# Patient Record
Sex: MALE | Race: WHITE | NOT HISPANIC OR LATINO | URBAN - METROPOLITAN AREA
[De-identification: names, ages, dates, MRNs, and addresses within clinical notes are randomized per-mention and may not be internally consistent; named-entity substitution may affect disease eponyms.]

---

## 2017-07-31 ENCOUNTER — OUTPATIENT (OUTPATIENT)
Dept: OUTPATIENT SERVICES | Facility: HOSPITAL | Age: 57
LOS: 1 days | Discharge: HOME | End: 2017-07-31

## 2017-07-31 DIAGNOSIS — G35 MULTIPLE SCLEROSIS: ICD-10-CM

## 2019-04-25 PROBLEM — Z00.00 ENCOUNTER FOR PREVENTIVE HEALTH EXAMINATION: Status: ACTIVE | Noted: 2019-04-25

## 2019-06-04 ENCOUNTER — RECORD ABSTRACTING (OUTPATIENT)
Age: 59
End: 2019-06-04

## 2019-06-04 DIAGNOSIS — Z78.9 OTHER SPECIFIED HEALTH STATUS: ICD-10-CM

## 2019-06-04 DIAGNOSIS — F41.9 ANXIETY DISORDER, UNSPECIFIED: ICD-10-CM

## 2019-08-08 ENCOUNTER — APPOINTMENT (OUTPATIENT)
Dept: NEUROLOGY | Facility: CLINIC | Age: 59
End: 2019-08-08
Payer: COMMERCIAL

## 2019-08-08 ENCOUNTER — OUTPATIENT (OUTPATIENT)
Dept: OUTPATIENT SERVICES | Facility: HOSPITAL | Age: 59
LOS: 1 days | Discharge: HOME | End: 2019-08-08

## 2019-08-08 VITALS
BODY MASS INDEX: 24.34 KG/M2 | HEIGHT: 70 IN | DIASTOLIC BLOOD PRESSURE: 83 MMHG | WEIGHT: 170 LBS | SYSTOLIC BLOOD PRESSURE: 133 MMHG | HEART RATE: 66 BPM

## 2019-08-08 DIAGNOSIS — G35 MULTIPLE SCLEROSIS: ICD-10-CM

## 2019-08-08 PROCEDURE — 99213 OFFICE O/P EST LOW 20 MIN: CPT

## 2019-08-08 NOTE — ASSESSMENT
[FreeTextEntry1] : Chronic MS, increased symptoms on left probably related to increased body temperature. Discussed option of MRI brain/spine but declined. \par \par 1.  Continue tecfidera 240 mg bid.\par 2.  Cont. Vit D  4,000 IU daily.\par 3.  Discussed environmental temperature control.\par 4.  Discussed option of left foot AFO but declines at this time.\par 5.  Return in 6 months.\par 6.  Refills given of Xanax 1 mg 1/2 to 1 tab up to twice a day (states 60 tabs will last him 6 months).\par 7.  Baclofen 10 mg one and one-half tab po tid.\par 8.  Check CMP, CBC.

## 2019-08-08 NOTE — HISTORY OF PRESENT ILLNESS
[FreeTextEntry1] : Patient is a 58 year old male who is being seen today as a follow up for MS.  Patient p/w reemergence of old symptoms most likely from the summer heat and life stressors.  He c/o balance and weakness of his left leg and foot.  He continues to exercise in a gym 6 days a week but has noticed loss of balance on his left side, especially when lifting medicine ball/weight towards his left side.  He has left foot drop and If he does not pay attention while walking he will fall.  He continues to see pain management for right side sciatica pain.

## 2019-08-20 LAB
ALBUMIN SERPL ELPH-MCNC: 4.7 G/DL
ALP BLD-CCNC: 77 U/L
ALT SERPL-CCNC: 40 U/L
ANION GAP SERPL CALC-SCNC: 13 MMOL/L
AST SERPL-CCNC: 31 U/L
BASOPHILS # BLD AUTO: 0.04 K/UL
BASOPHILS NFR BLD AUTO: 0.5 %
BILIRUB SERPL-MCNC: 0.3 MG/DL
BUN SERPL-MCNC: 26 MG/DL
CALCIUM SERPL-MCNC: 9.6 MG/DL
CHLORIDE SERPL-SCNC: 101 MMOL/L
CO2 SERPL-SCNC: 28 MMOL/L
CREAT SERPL-MCNC: 0.8 MG/DL
EOSINOPHIL # BLD AUTO: 0.14 K/UL
EOSINOPHIL NFR BLD AUTO: 1.8 %
GLUCOSE SERPL-MCNC: 103 MG/DL
HCT VFR BLD CALC: 43.9 %
HGB BLD-MCNC: 14.9 G/DL
IMM GRANULOCYTES NFR BLD AUTO: 0.5 %
LYMPHOCYTES # BLD AUTO: 1.08 K/UL
LYMPHOCYTES NFR BLD AUTO: 13.6 %
MAN DIFF?: NORMAL
MCHC RBC-ENTMCNC: 33.4 PG
MCHC RBC-ENTMCNC: 33.9 G/DL
MCV RBC AUTO: 98.4 FL
MONOCYTES # BLD AUTO: 0.81 K/UL
MONOCYTES NFR BLD AUTO: 10.2 %
NEUTROPHILS # BLD AUTO: 5.84 K/UL
NEUTROPHILS NFR BLD AUTO: 73.4 %
PLATELET # BLD AUTO: 276 K/UL
POTASSIUM SERPL-SCNC: 4.9 MMOL/L
PROT SERPL-MCNC: 7.1 G/DL
RBC # BLD: 4.46 M/UL
RBC # FLD: 12.6 %
SODIUM SERPL-SCNC: 142 MMOL/L
WBC # FLD AUTO: 7.95 K/UL

## 2019-08-30 ENCOUNTER — OTHER (OUTPATIENT)
Age: 59
End: 2019-08-30

## 2019-08-30 DIAGNOSIS — R10.9 UNSPECIFIED ABDOMINAL PAIN: ICD-10-CM

## 2019-09-10 ENCOUNTER — RX RENEWAL (OUTPATIENT)
Age: 59
End: 2019-09-10

## 2020-01-08 ENCOUNTER — RX RENEWAL (OUTPATIENT)
Age: 60
End: 2020-01-08

## 2020-02-11 ENCOUNTER — APPOINTMENT (OUTPATIENT)
Dept: NEUROLOGY | Facility: CLINIC | Age: 60
End: 2020-02-11
Payer: COMMERCIAL

## 2020-02-11 VITALS
BODY MASS INDEX: 24.34 KG/M2 | HEIGHT: 70 IN | DIASTOLIC BLOOD PRESSURE: 87 MMHG | TEMPERATURE: 97.9 F | HEART RATE: 73 BPM | OXYGEN SATURATION: 97 % | WEIGHT: 170 LBS | SYSTOLIC BLOOD PRESSURE: 152 MMHG

## 2020-02-11 PROCEDURE — 99213 OFFICE O/P EST LOW 20 MIN: CPT

## 2020-02-11 NOTE — PHYSICAL EXAM
[FreeTextEntry1] : Patient has slight facial asymmetry but fluent speech, no dysarthria.\par EOM's full.\par Motor:  5/5 right upper and lower extremity.  5/5 left upper extremity.\par Left lower extremity 4/5 left HF, 4+/5 left knee extension 4/5 left knee flexion, 4/5 left foot DF, 4-/5 eversion.\par Increased tone left lower extremity.\par Spastic gait.\par Sensory intact vibration, LT and PP upper and lower extremities\par TTP bilateral forefeet over metatarsals.

## 2020-02-11 NOTE — HISTORY OF PRESENT ILLNESS
[FreeTextEntry1] : Pt presents for f/u of MS.  Has disease now for 30 years.  Feels he is secondary progressive phase.  Has flare late August and given oral steroids 250 mg/day x 5 days but only able to tolerate 3 days of the steroids.  Trouble sleeping, agitation.  Stretches for an hour a day.  Does gym 6 days a week for 1.5 to 2 hours.  States increase in foot pain.  Squats a couple of hundred pounds.  Feels left leg is atrophying.  He noticies decrease in strength.  Feels he wants to fight to stay out of wheelchairs.  Taking 1.5 tabs of 10 mg baclofen three times a day 45 mg.   Can't do things for long due to foot pain.  Has had foot pain for a couple of month.  23-Mar-2017

## 2020-02-11 NOTE — ASSESSMENT
[FreeTextEntry1] : Relapsing progressive MS.  We talked about different treatment options.  He wishes to continue Tecfidera and does not want to increase baclofen.  He declines neuroimaging.  We discussed treatment options of adding monthly IV Solumedrol 1,000 mg or treating with methotrexate but he declined.  He will continue vitamin D 4,000 IU daily and will get Vit D level checked through PCP.  I offered him bracing for his left foot drop but he declines.  I did encourage him to see a  podiatrist regarding his bilateral forefoot pain.  He will return in 6 months.

## 2020-08-10 ENCOUNTER — APPOINTMENT (OUTPATIENT)
Dept: NEUROLOGY | Facility: CLINIC | Age: 60
End: 2020-08-10

## 2020-10-06 ENCOUNTER — APPOINTMENT (OUTPATIENT)
Dept: NEUROLOGY | Facility: CLINIC | Age: 60
End: 2020-10-06
Payer: COMMERCIAL

## 2020-10-06 VITALS
HEART RATE: 76 BPM | SYSTOLIC BLOOD PRESSURE: 146 MMHG | TEMPERATURE: 97.5 F | WEIGHT: 165 LBS | DIASTOLIC BLOOD PRESSURE: 82 MMHG | HEIGHT: 70 IN | BODY MASS INDEX: 23.62 KG/M2 | OXYGEN SATURATION: 98 %

## 2020-10-06 DIAGNOSIS — M54.5 LOW BACK PAIN: ICD-10-CM

## 2020-10-06 PROCEDURE — 99214 OFFICE O/P EST MOD 30 MIN: CPT

## 2020-10-06 RX ORDER — BACLOFEN 10 MG/1
10 TABLET ORAL
Qty: 135 | Refills: 11 | Status: DISCONTINUED | COMMUNITY
Start: 2019-08-08 | End: 2020-10-06

## 2020-10-06 NOTE — HISTORY OF PRESENT ILLNESS
[FreeTextEntry1] : Pt presents for f/u of relapsing progressive MS.  Has MS now for 32 years.  Not able to workout now for approaching 170+ days now.  States lost 5 lbs.  Feels that legs have been like a buzzing feeling.  Has fallen quite a few times, balance is always an issue.  Has to be cautious, starts to forget and marching as if normal then falls.   \par \par Walking unsteady gait, wakes up with cramping in legs, last baclofen 12 hours earlier.  Takes it first thing in am with fish oil and vitamin D.  Still on Tecfidera.  \par \par States he feels in definite decline.  first 3 digits numb and tingling, seems to come and go with other MS symptoms.  C/o lots of back.\par \par 1 year ago was able to climb stairs better.  \par \par States went to gym recently in process of reopening.  Squatting was more difficult.  Tougher climbing the stairs.  \par \par States had one flare up over summer states the steroids made him crazy, had to take romel 3-4 days of it before stopping.  States felt maybe shortened course but did not like the side effects.\par \par \par Was seeing pain doctors for chronic LBP on right.  Has received nerve ablation, epidural steroids.  Only ablation helped.   Pain bothers him all day.  \par

## 2020-10-06 NOTE — ASSESSMENT
[FreeTextEntry1] : Relapsing progressive MS.  Discussed alternative treatments includign Ocrevus but patient wishes to continue the Tecfidera.  Will check CMP and CBC.  States PCP managing Vit D and levels normal.  Will add methocarbamol 500 mg 1/2 to 1 tab po qhs to help with LBP until he gets back in to see pain doctors.\par \par Return in 6 months.

## 2020-10-06 NOTE — PHYSICAL EXAM
[FreeTextEntry1] : Speech is fluent, no dysarthria, , EOM's full.\par 5/5 proximal and distal UE's except for 4+/5 right thumb abduction and opposition.  \par Sensory decreased sensation to LT, PP digits 1-3 on right hand,  + Tinel's.\par RLE 5/5 \par LLE 4/5 proximally and distally.\par Unsteady gait, spastic and circumducting.

## 2020-12-09 LAB
ALBUMIN SERPL ELPH-MCNC: 4.4 G/DL
ALP BLD-CCNC: 83 U/L
ALT SERPL-CCNC: 28 U/L
ANION GAP SERPL CALC-SCNC: 13 MMOL/L
AST SERPL-CCNC: 23 U/L
BASOPHILS # BLD AUTO: 0.02 K/UL
BASOPHILS NFR BLD AUTO: 0.3 %
BILIRUB SERPL-MCNC: 0.3 MG/DL
BUN SERPL-MCNC: 20 MG/DL
CALCIUM SERPL-MCNC: 9.8 MG/DL
CHLORIDE SERPL-SCNC: 101 MMOL/L
CO2 SERPL-SCNC: 28 MMOL/L
CREAT SERPL-MCNC: 0.7 MG/DL
EOSINOPHIL # BLD AUTO: 0.23 K/UL
EOSINOPHIL NFR BLD AUTO: 3.1 %
GLUCOSE SERPL-MCNC: 125 MG/DL
HCT VFR BLD CALC: 47 %
HGB BLD-MCNC: 15.2 G/DL
IMM GRANULOCYTES NFR BLD AUTO: 0.4 %
LYMPHOCYTES # BLD AUTO: 1.43 K/UL
LYMPHOCYTES NFR BLD AUTO: 19.2 %
MAN DIFF?: NORMAL
MCHC RBC-ENTMCNC: 32.3 G/DL
MCHC RBC-ENTMCNC: 32.5 PG
MCV RBC AUTO: 100.6 FL
MONOCYTES # BLD AUTO: 0.9 K/UL
MONOCYTES NFR BLD AUTO: 12.1 %
NEUTROPHILS # BLD AUTO: 4.82 K/UL
NEUTROPHILS NFR BLD AUTO: 64.9 %
PLATELET # BLD AUTO: 296 K/UL
POTASSIUM SERPL-SCNC: 4.5 MMOL/L
PROT SERPL-MCNC: 6.8 G/DL
RBC # BLD: 4.67 M/UL
RBC # FLD: 13.2 %
SODIUM SERPL-SCNC: 142 MMOL/L
WBC # FLD AUTO: 7.43 K/UL

## 2021-04-15 ENCOUNTER — APPOINTMENT (OUTPATIENT)
Dept: NEUROLOGY | Facility: CLINIC | Age: 61
End: 2021-04-15
Payer: COMMERCIAL

## 2021-04-15 VITALS
HEIGHT: 70 IN | HEART RATE: 78 BPM | OXYGEN SATURATION: 99 % | TEMPERATURE: 97.8 F | BODY MASS INDEX: 23.62 KG/M2 | DIASTOLIC BLOOD PRESSURE: 71 MMHG | WEIGHT: 165 LBS | SYSTOLIC BLOOD PRESSURE: 147 MMHG

## 2021-04-15 DIAGNOSIS — M54.30 SCIATICA, UNSPECIFIED SIDE: ICD-10-CM

## 2021-04-15 PROCEDURE — 99072 ADDL SUPL MATRL&STAF TM PHE: CPT

## 2021-04-15 PROCEDURE — 99214 OFFICE O/P EST MOD 30 MIN: CPT

## 2021-04-18 PROBLEM — M54.30 SCIATICA: Status: ACTIVE | Noted: 2019-06-04

## 2021-04-18 NOTE — PHYSICAL EXAM
[FreeTextEntry1] : fluent speech, no dysarthria.\par Mild muscle atrophy LLE compared to right\par Left foot dorsiflexion weakness.\par Mildly unsteady gait.\par Mild increased tone in extremities.

## 2021-04-18 NOTE — HISTORY OF PRESENT ILLNESS
[FreeTextEntry1] : Pt presents for f/u of secondary progressive MS.  Feels like he lost ground this past year during pandemic.  Was going to gym daily but had to work out at  home.  Has more falls, walking distance has declined.  Before pandemic was a little better.  Limps from one room to next.  Balance is terrible.  Fell when taking wife out to [employer].

## 2021-04-18 NOTE — ASSESSMENT
[FreeTextEntry1] : Multiple sclerosis, continued gradual decline.  Some of this related to decrease in workouts (used to spend significant time in gyms) now most of workouts at home.  Some is related to progression of his MS.\par \par Plan:\par 1.  Continue Tecfidera.\par 2.  Continue baclofen for spasticity.\par 3.  Continue vitamin D supplementation.\par 4.  Continue regular workouts.\par 5.  Alprazolam prn for anxiety.  A refill was provided.\par 6.  Gabapentin 100 mg three times daily to help with sciatic pain.\par 7.  PM&R referral for spring loaded AFO for left foot.\par 8.  Return in 6 months.\par

## 2021-11-24 ENCOUNTER — APPOINTMENT (OUTPATIENT)
Dept: NEUROLOGY | Facility: CLINIC | Age: 61
End: 2021-11-24
Payer: COMMERCIAL

## 2021-11-24 VITALS
DIASTOLIC BLOOD PRESSURE: 84 MMHG | TEMPERATURE: 97.9 F | WEIGHT: 162 LBS | OXYGEN SATURATION: 98 % | BODY MASS INDEX: 23.19 KG/M2 | SYSTOLIC BLOOD PRESSURE: 133 MMHG | HEIGHT: 70 IN | HEART RATE: 67 BPM

## 2021-11-24 DIAGNOSIS — R26.81 UNSTEADINESS ON FEET: ICD-10-CM

## 2021-11-24 DIAGNOSIS — N31.9 NEUROMUSCULAR DYSFUNCTION OF BLADDER, UNSPECIFIED: ICD-10-CM

## 2021-11-24 DIAGNOSIS — N52.9 MALE ERECTILE DYSFUNCTION, UNSPECIFIED: ICD-10-CM

## 2021-11-24 PROCEDURE — 99214 OFFICE O/P EST MOD 30 MIN: CPT

## 2021-11-24 RX ORDER — DULOXETINE HYDROCHLORIDE 20 MG/1
20 CAPSULE, DELAYED RELEASE PELLETS ORAL
Qty: 30 | Refills: 11 | Status: DISCONTINUED | COMMUNITY
Start: 2021-09-17 | End: 2021-11-24

## 2021-11-24 RX ORDER — GABAPENTIN 100 MG/1
100 CAPSULE ORAL
Qty: 90 | Refills: 5 | Status: DISCONTINUED | COMMUNITY
Start: 2021-04-15 | End: 2021-11-24

## 2021-11-24 RX ORDER — DIMETHYL FUMARATE 120-240 MG
KIT ORAL
Refills: 0 | Status: DISCONTINUED | COMMUNITY
End: 2021-11-24

## 2021-11-24 RX ORDER — METHOCARBAMOL 500 MG/1
500 TABLET, FILM COATED ORAL
Qty: 30 | Refills: 5 | Status: DISCONTINUED | COMMUNITY
Start: 2020-10-06 | End: 2021-11-24

## 2021-11-24 RX ORDER — ALPRAZOLAM 2 MG/1
TABLET ORAL
Refills: 0 | Status: DISCONTINUED | COMMUNITY
End: 2021-11-24

## 2021-11-24 NOTE — ASSESSMENT
[FreeTextEntry1] : Multiple sclerosis, progression in symptoms more ambulatory difficulty and bladder control and erectile difficulty.\par \par Plan:\par 1.  Will recheck imaging brain and cervical spine w/wo contrast.\par 2.  Continue tecfidera for now but may need to change disease modifying therapy depending on results.\par 3.  Continue Vit D supplementation and forwards labs from PCP.\par 4.  Viagra 50 mg prn for erectile dysfunction.\par 5.  Trial of 2.5 to 5 mg ditropan at night to help with nocturia though explained possibility of urinary retention.\par 6.  Ampyra 10 mg XR once in am.\par 7.  Physical therapy for frequent falls.\par 8.  Discussed disease progression and to consider slightly less intensity in workouts so he isn't "wiped out" for days afterwards.\par \par \par

## 2021-11-24 NOTE — HISTORY OF PRESENT ILLNESS
[FreeTextEntry1] : Pt is 60 yom with MS, feels more progressive now.  Has numbness in left leg (first experienced July '88).  When had diplooia 2 year later dx'd with MS  Feels more cognitive slowing, fogginess.  Endurance is worse.\par \par Walks endurance is down and slower, using cane for longer distances, wants to try Ampyra again.  Is falling more and more.  Bladder control problems - sometimes initiating the flow.  Gets up 2-3 times a night.  PSA numbers are good, gets them checked every year.\par \par Has several bad neurologic days a week.  Had to cancel a dentist appointment recently due to bouncing off walls, unsteadiness walking down hallways.    States after a workout at gym - high intensity interval workout training.  2 min warm  up 20 seconds of high intensity.  Was wiped out for days.  States that on days he can push he pushes himself.  His diet is perfect.  \par \par States vitamiin D levels are good.  Supplements with 5,000 IU daily.  PCP follow levels.\par \par Wants Viagra due to erectile dysfunction. States 50 mg tabs worked well for him in the past. \par \par Stress and heat are triggers.

## 2021-11-24 NOTE — PHYSICAL EXAM
[FreeTextEntry1] : walks leaning forward.\par slow spastic gait.\par EOM's full, no SYLVIE\par FTN mild ataxia\par UE's strong proximally and distally.\par LLE weak with hip and knee flexion and foot DF 4/5.

## 2022-03-30 DIAGNOSIS — M79.2 NEURALGIA AND NEURITIS, UNSPECIFIED: ICD-10-CM

## 2022-04-14 ENCOUNTER — RX RENEWAL (OUTPATIENT)
Age: 62
End: 2022-04-14

## 2022-05-16 RX ORDER — GABAPENTIN 100 MG/1
100 CAPSULE ORAL
Qty: 270 | Refills: 3 | Status: DISCONTINUED | COMMUNITY
Start: 2022-03-30 | End: 2022-05-16

## 2022-06-06 ENCOUNTER — APPOINTMENT (OUTPATIENT)
Dept: NEUROLOGY | Facility: CLINIC | Age: 62
End: 2022-06-06
Payer: COMMERCIAL

## 2022-06-06 VITALS
WEIGHT: 162 LBS | DIASTOLIC BLOOD PRESSURE: 87 MMHG | SYSTOLIC BLOOD PRESSURE: 154 MMHG | HEIGHT: 70 IN | TEMPERATURE: 97 F | OXYGEN SATURATION: 98 % | BODY MASS INDEX: 23.19 KG/M2 | HEART RATE: 67 BPM

## 2022-06-06 DIAGNOSIS — R26.2 DIFFICULTY IN WALKING, NOT ELSEWHERE CLASSIFIED: ICD-10-CM

## 2022-06-06 PROCEDURE — 99214 OFFICE O/P EST MOD 30 MIN: CPT

## 2022-06-06 NOTE — PHYSICAL EXAM
[FreeTextEntry1] : Spastic circumducting gait.\par Left foot drop.\par Ataxia finger to nose on left.\par Postural tremor left hand.\par thoracolumbar spine seems to have S curve I have not noticed before.\par \par

## 2022-06-06 NOTE — HISTORY OF PRESENT ILLNESS
[FreeTextEntry1] : Pt is 62 yo LH male with MS now likely relapsing progressive.  First MS sx's 1988, dx'd in 1990.  Never on disease modifying therapy until a few years ago Tecfidera.  Now has new lesion at C4 but stable brain lesions.  Baseline sx's include left foot drop and spasticity, left hand tremor and ataxia.  Feels this year old sx's increased, feels that Tecfidera no longer working for him.  Didn't take DMT for years because he states he was doing so well and views meds as poison.  Is willing to consider new therapy now but says can't give himself any injections.  Is aware of infusions e.g. Ocrevus but somewhat reluctant to do strong therapy for fear of PML.\par \par Is on daily D supplementation.  For symptomatic relief of frequent nocturnal urination tried ditropan but stopped it due to difficulty urinating.  Had a fall down stairs earlier this year and had severe bruising lower back.  No blood in urine. States had plainfilms negative for fracture.  Wife states his back is crooked since.\par \par States is seen in back clinic and had RF more than once.

## 2022-06-06 NOTE — ASSESSMENT
[FreeTextEntry1] : MS likely relapsing progressive.  Interested in changing platform therapy.\par Discussed Ocrevus.  Will refer to Dr. Villalobos for consultation.

## 2023-02-07 ENCOUNTER — APPOINTMENT (OUTPATIENT)
Dept: NEUROLOGY | Facility: CLINIC | Age: 63
End: 2023-02-07

## 2023-07-06 ENCOUNTER — APPOINTMENT (OUTPATIENT)
Dept: NEUROLOGY | Facility: CLINIC | Age: 63
End: 2023-07-06
Payer: COMMERCIAL

## 2023-07-06 ENCOUNTER — NON-APPOINTMENT (OUTPATIENT)
Age: 63
End: 2023-07-06

## 2023-07-06 VITALS
DIASTOLIC BLOOD PRESSURE: 83 MMHG | WEIGHT: 160 LBS | HEART RATE: 64 BPM | SYSTOLIC BLOOD PRESSURE: 149 MMHG | HEIGHT: 70 IN | BODY MASS INDEX: 22.9 KG/M2

## 2023-07-06 DIAGNOSIS — M21.372 FOOT DROP, LEFT FOOT: ICD-10-CM

## 2023-07-06 PROCEDURE — 99215 OFFICE O/P EST HI 40 MIN: CPT

## 2023-07-06 RX ORDER — LIDOCAINE 5 G/100G
5 OINTMENT TOPICAL
Qty: 1 | Refills: 5 | Status: DISCONTINUED | COMMUNITY
Start: 2022-05-16 | End: 2023-07-06

## 2023-07-06 RX ORDER — ALPRAZOLAM 1 MG/1
1 TABLET ORAL
Qty: 60 | Refills: 0 | Status: DISCONTINUED | COMMUNITY
Start: 2019-08-08 | End: 2023-07-06

## 2023-07-06 RX ORDER — PREDNISONE 50 MG/1
50 TABLET ORAL 4 TIMES DAILY
Qty: 100 | Refills: 0 | Status: DISCONTINUED | COMMUNITY
Start: 2019-08-30 | End: 2023-07-06

## 2023-07-06 RX ORDER — DIMETHYL FUMARATE 240 MG/1
CAPSULE ORAL
Qty: 180 | Refills: 3 | Status: DISCONTINUED | COMMUNITY
Start: 2019-09-10 | End: 2023-07-06

## 2023-07-06 RX ORDER — OXYBUTYNIN CHLORIDE 5 MG/1
5 TABLET ORAL
Qty: 30 | Refills: 5 | Status: DISCONTINUED | COMMUNITY
Start: 2021-11-24 | End: 2023-07-06

## 2023-07-06 RX ORDER — PREDNISONE 50 MG/1
50 TABLET ORAL DAILY
Qty: 100 | Refills: 0 | Status: DISCONTINUED | COMMUNITY
Start: 2019-08-30 | End: 2023-07-06

## 2023-07-06 RX ORDER — FAMOTIDINE 20 MG/1
20 TABLET, FILM COATED ORAL TWICE DAILY
Qty: 10 | Refills: 0 | Status: DISCONTINUED | COMMUNITY
Start: 2019-08-30 | End: 2023-07-06

## 2023-07-06 RX ORDER — DALFAMPRIDINE 10 MG/1
TABLET, FILM COATED, EXTENDED RELEASE ORAL
Qty: 30 | Refills: 11 | Status: DISCONTINUED | COMMUNITY
Start: 2021-11-24 | End: 2023-07-06

## 2023-07-06 RX ORDER — CHROMIUM 200 MCG
TABLET ORAL
Refills: 0 | Status: DISCONTINUED | COMMUNITY
End: 2023-07-06

## 2023-07-06 RX ORDER — DIMETHYL FUMARATE 240 MG/1
CAPSULE, DELAYED RELEASE ORAL
Qty: 180 | Refills: 3 | Status: DISCONTINUED | COMMUNITY
Start: 2022-04-14 | End: 2023-07-06

## 2023-07-06 RX ORDER — SILDENAFIL CITRATE 50 MG/1
50 TABLET, FILM COATED ORAL
Qty: 30 | Refills: 5 | Status: DISCONTINUED | COMMUNITY
Start: 2021-11-24 | End: 2023-07-06

## 2023-07-09 RX ORDER — CELECOXIB 200 MG/1
200 CAPSULE ORAL
Qty: 60 | Refills: 0 | Status: ACTIVE | COMMUNITY
Start: 2023-07-03

## 2023-07-09 RX ORDER — HYDROCODONE BITARTRATE AND ACETAMINOPHEN 5; 325 MG/1; MG/1
5-325 TABLET ORAL
Qty: 60 | Refills: 0 | Status: ACTIVE | COMMUNITY
Start: 2023-07-03

## 2023-07-09 NOTE — PHYSICAL EXAM
[FreeTextEntry1] : Mental status: Awake, alert and oriented x3.  Recent and remote memory intact.  Naming, repetition and comprehension intact.  Attention/concentration intact.  No dysarthria, no aphasia.  \par \par Cranial nerves: Pupils equally round and reactive to light, visual fields full, no nystagmus, extraocular muscles intact, V1 through V3 intact bilaterally and symmetric, face symmetric, hearing intact to finger rub, palate elevation symmetric, tongue was midline.\par \par Motor:  MRC grading 5/5 b/l UE. RLE 4/5. LLE 3/5. L foot: plantar flexion 4/5, Dorsiflexion 3/5, Adduction 2/5, Abduction 2/5.   strength 5/5.  Normal tone and bulk.  No abnormal movements. \par \par Sensation: Intact to light touch, vibration, temperature, and pinprick.\par \par Coordination: Mild dysmetria on finger-to-nose left hand.\par \par Reflexes: 2+ in bilateral UE/LE\par \par Gait: Uses a cane, narrow and not steady. Tripping noted when walking from chair to door, multistep turns. \par

## 2023-07-09 NOTE — ASSESSMENT
[FreeTextEntry1] : Mr. PHAM 62 year old male presents today with an MS relapse. Patient noticed symptoms getting worse since May 2023 when his family underwent many deaths resulting in high levels of stress. Unsure if relapse is isolated due to stress vs. new MS lesions. \par \par Plan: \par -MRI CSpine/Head/Tspine to evaluate new MS lesions\par -IV Solumedrol at home, patient refused PO steroids\par -Continue Baclofen 10 mg\par -Continue Alprazolam 1 mg\par -RTC 6 months, will be seeing MS Specialist in Sept for Ocrevus infusion

## 2023-07-09 NOTE — HISTORY OF PRESENT ILLNESS
[FreeTextEntry1] : Mr. PHAM 62 year old male reports today to follow up on RRMS. Was diagnosed in 1990, and did not take medications for many years, until he started Tecfidera a few years ago. At his last visit 6/2022 he had a new lesion at C4, but no new brain lesions. Symptoms at last visit included left foot drop/spasticity, left hand tremor, ataxia on finger to nose testing. Was told to see Dr. Villalobos and discuss switching medication to Ocrevus.\par Saw Dr. Forrester in [place], started Ocrevus in September, returned in March and tolerated well. Started on 1/2 dose infusion, Will have next dose in Sept, for a full infusion. No recent scans since the new lesion. \par \par May 2023: four family members passed started to relapse. Numbness in both feet, Right hand numbness. Rarm weakness, issues with holding objects. +López fog, feels his thinking is slow. Walking has been bad, uses a cane as of recently, has been falling. Is having issues with the heat. No incontinence, wakes up multiple times a night. Tremors on left hand, more with purpose, not at rest. L Foot swelling. Only gets blurry vision if overheats. \par Uses baclofen for spasms, spasms are worst at  night due to medicationwearing off, gets break through spasms.  Still has L foot drop, does work with a physical therapist, never used a brace. \par \jake Took duloxetine, nutryptiline, lyrica, and gabapentin, which didn’t help his neuropathy. Took gabapentin for 12 days, found himself tripping and falling more while on gabapentin. \par \jake Goes to the gym every day. Does not want to take steroids. \par \par

## 2023-07-09 NOTE — REVIEW OF SYSTEMS
[Arm Weakness] : arm weakness [Hand Weakness] :  hand weakness [Leg Weakness] : leg weakness [Poor Coordination] : poor coordination [Numbness] : numbness [Tingling] : tingling [Ataxia] : ataxia [Frequent Falls] : frequent falls [Negative] : Psychiatric [Memory Lapses or Loss] : no memory loss [Decr. Concentrating Ability] : no decrease in concentrating ability

## 2023-09-21 ENCOUNTER — APPOINTMENT (OUTPATIENT)
Dept: NEUROLOGY | Facility: CLINIC | Age: 63
End: 2023-09-21

## 2024-02-21 ENCOUNTER — APPOINTMENT (OUTPATIENT)
Dept: NEUROLOGY | Facility: CLINIC | Age: 64
End: 2024-02-21
Payer: COMMERCIAL

## 2024-02-21 VITALS
BODY MASS INDEX: 23.48 KG/M2 | SYSTOLIC BLOOD PRESSURE: 151 MMHG | HEIGHT: 70 IN | DIASTOLIC BLOOD PRESSURE: 86 MMHG | HEART RATE: 77 BPM | WEIGHT: 164 LBS

## 2024-02-21 DIAGNOSIS — G35 MULTIPLE SCLEROSIS: ICD-10-CM

## 2024-02-21 PROCEDURE — 99214 OFFICE O/P EST MOD 30 MIN: CPT

## 2024-02-21 RX ORDER — OMEPRAZOLE 40 MG/1
40 CAPSULE, DELAYED RELEASE ORAL
Qty: 30 | Refills: 0 | Status: DISCONTINUED | COMMUNITY
Start: 2023-01-24 | End: 2024-02-21

## 2024-02-21 RX ORDER — DIAZEPAM 10 MG/1
10 TABLET ORAL
Qty: 30 | Refills: 0 | Status: DISCONTINUED | COMMUNITY
Start: 2023-02-08 | End: 2024-02-21

## 2024-02-21 RX ORDER — DULOXETINE HYDROCHLORIDE 60 MG/1
60 CAPSULE, DELAYED RELEASE PELLETS ORAL
Qty: 30 | Refills: 0 | Status: DISCONTINUED | COMMUNITY
Start: 2023-02-07 | End: 2024-02-21

## 2024-02-21 RX ORDER — DEXAMETHASONE 4 MG/1
4 TABLET ORAL
Qty: 9 | Refills: 0 | Status: DISCONTINUED | COMMUNITY
Start: 2023-07-09 | End: 2024-02-21

## 2024-02-21 NOTE — REVIEW OF SYSTEMS
[Feeling Poorly] : feeling poorly [As Noted in HPI] : as noted in HPI [Arthralgias] : arthralgias [Joint Pain] : joint pain [Negative] : Gastrointestinal

## 2024-02-21 NOTE — HISTORY OF PRESENT ILLNESS
[FreeTextEntry1] : Since last visit he did home solumedrol infusion and did not notice much difference.  He has done an Ocrevus infusion with his MS Neurologist in Nov 2023 and is due in April/May 2024 for another infusion.  He is getting severe burning in both feet and also severe back pain.  He is going for radiofrequency ablation in the next week with pain management.  He has tried gabapentin, TCA, lyrica and cymbalta for his neuropathy all with side effects he couldnt tolerate.  He has continued weakness in his L>R side and he says he exercises regularly.  63 year old male reports today to follow up on RRMS. Was diagnosed in 1990, and did not take medications for many years, until he started Tecfidera a few years ago. Started seeing Dr. Forrester in [place] in 09/2022 for   new lesions on C-spine, started on Ocrevus.  Last visit in 07/2023: was having more relapses since 05/2023 when he had multiple death in family. Was started on IV steroid and MR I head and spine ordered.

## 2024-02-21 NOTE — ASSESSMENT
[FreeTextEntry1] : Mr. PHAM 62 year old male presents today with an MS relapse. Patient noticed symptoms getting worse since May 2023 when his family underwent many deaths resulting in high levels of stress. Unsure if relapse is isolated due to stress vs. new MS lesions.    Plan:  -Obtain MRI cervical and brain results (said done but no report in chart)  -Trial of Ampyra 10mg BID  -Continue Baclofen 10 mg  -Continue Alprazolam 1 mg

## 2024-02-21 NOTE — PHYSICAL EXAM
[FreeTextEntry1] :     Mental status: Awake, alert and oriented x3. Recent and remote memory intact. Naming, repetition and comprehension intact. Attention/concentration intact. No dysarthria, no aphasia.    Cranial nerves: Pupils equally round and reactive to light, visual fields full, no nystagmus, extraocular muscles intact, V1 through V3 intact bilaterally and symmetric, face symmetric, hearing intact to finger rub, palate elevation symmetric, tongue was midline.    Motor: MRC grading 5/5 b/l UE except SA 4/5 (pain limited). RLE 4/5. LLE 3/5. L foot: plantar flexion 4/5, Dorsiflexion 3/5, Adduction 2/5, Abduction 2/5.  strength 5/5. Normal tone and bulk. No abnormal movements.    Sensation: Intact to light touch, vibration, temperature, and pinprick.    Coordination: Mild dysmetria on finger-to-nose left hand.    Reflexes: 2+ in bilateral UE/LE    Gait: Uses a cane, narrow and not steady. Tripping noted when walking from chair to door, multistep turns.

## 2024-02-23 RX ORDER — DALFAMPRIDINE 10 MG/1
10 TABLET, FILM COATED, EXTENDED RELEASE ORAL
Qty: 60 | Refills: 3 | Status: ACTIVE | COMMUNITY
Start: 2024-02-21 | End: 1900-01-01

## 2024-03-14 RX ORDER — BACLOFEN 10 MG/1
10 TABLET ORAL
Qty: 405 | Refills: 3 | Status: ACTIVE | COMMUNITY
Start: 1900-01-01 | End: 1900-01-01

## 2024-06-04 RX ORDER — ALPRAZOLAM 1 MG/1
1 TABLET ORAL
Qty: 30 | Refills: 0 | Status: ACTIVE | COMMUNITY
Start: 1900-01-01 | End: 1900-01-01

## 2024-08-14 RX ORDER — DESVENLAFAXINE 25 MG/1
25 TABLET, EXTENDED RELEASE ORAL AT BEDTIME
Qty: 30 | Refills: 0 | Status: ACTIVE | COMMUNITY
Start: 2024-08-14 | End: 1900-01-01

## 2024-08-14 RX ORDER — SAW/PYGEUM/BETA/HERB/D3/B6/ZN 30 MG-25MG
200 CAPSULE ORAL DAILY
Qty: 30 | Refills: 3 | Status: ACTIVE | COMMUNITY
Start: 2024-08-14 | End: 1900-01-01

## 2024-11-08 ENCOUNTER — APPOINTMENT (OUTPATIENT)
Dept: NEUROLOGY | Facility: CLINIC | Age: 64
End: 2024-11-08
Payer: COMMERCIAL

## 2024-11-08 VITALS
HEIGHT: 61 IN | BODY MASS INDEX: 30.21 KG/M2 | WEIGHT: 160 LBS | SYSTOLIC BLOOD PRESSURE: 122 MMHG | DIASTOLIC BLOOD PRESSURE: 84 MMHG | OXYGEN SATURATION: 97 % | HEART RATE: 84 BPM

## 2024-11-08 DIAGNOSIS — M21.372 FOOT DROP, LEFT FOOT: ICD-10-CM

## 2024-11-08 DIAGNOSIS — G35 MULTIPLE SCLEROSIS: ICD-10-CM

## 2024-11-08 DIAGNOSIS — M54.50 LOW BACK PAIN, UNSPECIFIED: ICD-10-CM

## 2024-11-08 DIAGNOSIS — M54.30 SCIATICA, UNSPECIFIED SIDE: ICD-10-CM

## 2024-11-08 PROCEDURE — G2211 COMPLEX E/M VISIT ADD ON: CPT | Mod: NC

## 2024-11-08 PROCEDURE — 99215 OFFICE O/P EST HI 40 MIN: CPT

## 2024-11-08 RX ORDER — IBUPROFEN 200 MG/1
TABLET, COATED ORAL
Refills: 0 | Status: ACTIVE | COMMUNITY

## 2025-01-22 ENCOUNTER — APPOINTMENT (OUTPATIENT)
Dept: NEUROLOGY | Facility: CLINIC | Age: 65
End: 2025-01-22

## 2025-03-31 ENCOUNTER — RX RENEWAL (OUTPATIENT)
Age: 65
End: 2025-03-31